# Patient Record
Sex: FEMALE | Race: OTHER | NOT HISPANIC OR LATINO | URBAN - METROPOLITAN AREA
[De-identification: names, ages, dates, MRNs, and addresses within clinical notes are randomized per-mention and may not be internally consistent; named-entity substitution may affect disease eponyms.]

---

## 2017-06-07 ENCOUNTER — OUTPATIENT (OUTPATIENT)
Dept: OUTPATIENT SERVICES | Facility: HOSPITAL | Age: 42
LOS: 1 days | Discharge: HOME | End: 2017-06-07

## 2017-06-28 DIAGNOSIS — N92.6 IRREGULAR MENSTRUATION, UNSPECIFIED: ICD-10-CM

## 2017-06-28 DIAGNOSIS — Z01.419 ENCOUNTER FOR GYNECOLOGICAL EXAMINATION (GENERAL) (ROUTINE) WITHOUT ABNORMAL FINDINGS: ICD-10-CM

## 2017-06-28 DIAGNOSIS — Z11.3 ENCOUNTER FOR SCREENING FOR INFECTIONS WITH A PREDOMINANTLY SEXUAL MODE OF TRANSMISSION: ICD-10-CM

## 2018-02-02 ENCOUNTER — OUTPATIENT (OUTPATIENT)
Dept: OUTPATIENT SERVICES | Facility: HOSPITAL | Age: 43
LOS: 1 days | Discharge: HOME | End: 2018-02-02

## 2018-02-02 DIAGNOSIS — Z00.00 ENCOUNTER FOR GENERAL ADULT MEDICAL EXAMINATION WITHOUT ABNORMAL FINDINGS: ICD-10-CM

## 2018-08-06 ENCOUNTER — TRANSCRIPTION ENCOUNTER (OUTPATIENT)
Age: 43
End: 2018-08-06

## 2020-04-26 ENCOUNTER — MESSAGE (OUTPATIENT)
Age: 45
End: 2020-04-26

## 2020-05-07 ENCOUNTER — APPOINTMENT (OUTPATIENT)
Dept: DISASTER EMERGENCY | Facility: HOSPITAL | Age: 45
End: 2020-05-07

## 2020-05-08 LAB
SARS-COV-2 IGG SERPL IA-ACNC: <0.1 INDEX
SARS-COV-2 IGG SERPL QL IA: NEGATIVE

## 2020-08-17 ENCOUNTER — TRANSCRIPTION ENCOUNTER (OUTPATIENT)
Age: 45
End: 2020-08-17

## 2021-04-28 ENCOUNTER — TRANSCRIPTION ENCOUNTER (OUTPATIENT)
Age: 46
End: 2021-04-28

## 2021-06-01 ENCOUNTER — TRANSCRIPTION ENCOUNTER (OUTPATIENT)
Age: 46
End: 2021-06-01

## 2022-06-20 ENCOUNTER — NON-APPOINTMENT (OUTPATIENT)
Age: 47
End: 2022-06-20

## 2023-11-07 ENCOUNTER — NON-APPOINTMENT (OUTPATIENT)
Age: 48
End: 2023-11-07

## 2023-11-24 ENCOUNTER — NON-APPOINTMENT (OUTPATIENT)
Age: 48
End: 2023-11-24

## 2024-03-23 ENCOUNTER — APPOINTMENT (OUTPATIENT)
Dept: ORTHOPEDIC SURGERY | Facility: CLINIC | Age: 49
End: 2024-03-23
Payer: OTHER MISCELLANEOUS

## 2024-03-23 ENCOUNTER — NON-APPOINTMENT (OUTPATIENT)
Age: 49
End: 2024-03-23

## 2024-03-23 VITALS — HEIGHT: 69 IN | WEIGHT: 125 LBS | BODY MASS INDEX: 18.51 KG/M2

## 2024-03-23 PROBLEM — Z00.00 ENCOUNTER FOR PREVENTIVE HEALTH EXAMINATION: Status: ACTIVE | Noted: 2024-03-23

## 2024-03-23 PROCEDURE — 99204 OFFICE O/P NEW MOD 45 MIN: CPT | Mod: ACP

## 2024-03-23 PROCEDURE — 72040 X-RAY EXAM NECK SPINE 2-3 VW: CPT

## 2024-03-23 PROCEDURE — 73080 X-RAY EXAM OF ELBOW: CPT | Mod: LT

## 2024-03-23 PROCEDURE — 73030 X-RAY EXAM OF SHOULDER: CPT | Mod: LT

## 2024-03-23 NOTE — IMAGING
[de-identified] : Physical examination left shoulder: No swelling, ecchymosis, erythema appreciated.  Skin is intact.  No midline neck or paraspinal tenderness.  Mild tenderness of the anterior glenohumeral joint and lateral deltoid.  Good range of motion upon flexion and abduction of the left shoulder without any limitations.  4-5 strength.  Negative Muro.  Negative Maury's.  Negative drop arm testing.  Sensorimotor intact distally.  Neurovascularly intact.  Physical examination of the left elbow: Mild swelling appreciated over the distal humerus and elbow.  Full range of motion upon full flexion extension however experiences pain upon palpation along the lateral aspect/lateral epicondyle of the left elbow.  Good strength throughout.   No instability appreciated.  Sensorimotor intact distally.  Neuro vas intact.  X-rays of the cervical spine, left shoulder, and left elbow taken in the office today:  No acute fractures, subluxations, or dislocations.

## 2024-03-23 NOTE — HISTORY OF PRESENT ILLNESS
[de-identified] : 48-year-old female here for evaluation status post a Worker's Compensation left arm injury.  Patient reports a heavy object fell and landed directly on her distal humerus/left elbow.  She reports bruising and pain in this area since the time of injury.  It happened a few days ago.  She is also reporting some numbness and tingling into he left hand and wrist.

## 2024-03-23 NOTE — DISCUSSION/SUMMARY
[de-identified] : The patient has a questionable acute closed nondisplaced fracture of the lateral condyle of the left elbow as evidenced on x-ray.  She is also having bruising and point tenderness in this area.  For these reasons, I will send the patient for left elbow MRI to further evaluate and further assess the internal derangement of a questionable acute closed nondisplaced fracture of the left elbow.  Prescription for MRI was placed.  Meloxicam sent to patient pharmacy to be taken as needed for pain.  Confirmed no contraindication to NSAIDs.  Encourage gentle range of motion activity modification until repeat evaluation.  She remain 100% temporary totally disabled from work at this time.  All questions and concerns addressed to patient's satisfaction. Patient expresses full understanding of treatment plan. I will have her follow-up with me in a couple of weeks for MRI results x-rays from the \[facility]\"" and for repeat evaluation and treatment.

## 2024-03-28 RX ORDER — TIZANIDINE 4 MG/1
4 TABLET ORAL
Qty: 7 | Refills: 0 | Status: ACTIVE | COMMUNITY
Start: 2024-03-28 | End: 1900-01-01

## 2024-04-02 ENCOUNTER — APPOINTMENT (OUTPATIENT)
Dept: ORTHOPEDIC SURGERY | Facility: CLINIC | Age: 49
End: 2024-04-02
Payer: OTHER MISCELLANEOUS

## 2024-04-02 DIAGNOSIS — M54.2 CERVICALGIA: ICD-10-CM

## 2024-04-02 PROCEDURE — 99204 OFFICE O/P NEW MOD 45 MIN: CPT

## 2024-04-09 ENCOUNTER — APPOINTMENT (OUTPATIENT)
Dept: ORTHOPEDIC SURGERY | Facility: CLINIC | Age: 49
End: 2024-04-09

## 2024-04-11 ENCOUNTER — NON-APPOINTMENT (OUTPATIENT)
Age: 49
End: 2024-04-11

## 2024-04-12 ENCOUNTER — APPOINTMENT (OUTPATIENT)
Dept: ORTHOPEDIC SURGERY | Facility: CLINIC | Age: 49
End: 2024-04-12

## 2024-04-18 ENCOUNTER — APPOINTMENT (OUTPATIENT)
Dept: ORTHOPEDIC SURGERY | Facility: CLINIC | Age: 49
End: 2024-04-18

## 2024-04-23 ENCOUNTER — RX RENEWAL (OUTPATIENT)
Age: 49
End: 2024-04-23

## 2024-05-13 NOTE — ADDENDUM
[FreeTextEntry1] : Patient continues to have neck pain radiating down her left arm despite physical therapy I am ordering MRI of the cervical spine.

## 2024-05-13 NOTE — DATA REVIEWED
[FreeTextEntry1] : I reviewed the patient's cervical spine x-ray from her prior visit she has C5-6 disc base degeneration with an anterior osteophyte calcification of the disc.

## 2024-05-13 NOTE — IMAGING
[de-identified] : TTP midline cervical spine and paraspinal musculature   Strength                                                                     Deltoid   Right: 5/5; Left: 5/5                      Biceps   Right: 5/5; Left: 5/5                   Triceps        Right: 5/5; Left: 5/5                                 Wrist Extensors     Right: 5/5; Left: 5/5 Finger Flexors     Right: 5/5; Left: 5/5 IO    Right: 5/5; Left: 5/5  Sensation C5   Right: 2/2; Left: 2/2 C6   Right: 2/2; Left: 2/2 C7   Right: 2/2; Left: 2/2 C8   Right: 2/2; Left: 2/2 T1   Right: 2/2; Left: 2/2  Reflexes Biceps   Right: 2+; Left 2+ Triceps   Right: 2+; Left 2+ Mcbride's  Right: Negative; L: Negative

## 2024-05-13 NOTE — HISTORY OF PRESENT ILLNESS
[de-identified] : 48-year-old female presents to me with neck pain.  This is after work-related accident on 3/20/2024.  She was seen here last week.  She was given a prescription for anti-inflammatories and x-ray of her cervical spine was taken.  Loss of bladder bowel.  Neck pain radiates down her left arm with a burning and numbness tingling into the hand.  Nothing on the right arm no balance issues.  No loss of bladder or bowel.

## 2024-05-13 NOTE — DISCUSSION/SUMMARY
[de-identified] : 48-year-old female with almost 2 weeks of cervical radiculopathy after work-related accident 3/20/2024.  I recommended physical therapy and continued anti-inflammatories she was prescribed.  Follow-up with me as needed.

## 2024-05-21 ENCOUNTER — RX RENEWAL (OUTPATIENT)
Age: 49
End: 2024-05-21

## 2024-05-21 RX ORDER — MELOXICAM 15 MG/1
15 TABLET ORAL
Qty: 30 | Refills: 0 | Status: ACTIVE | COMMUNITY
Start: 2024-03-23 | End: 1900-01-01

## 2024-06-05 ENCOUNTER — APPOINTMENT (OUTPATIENT)
Dept: NEUROLOGY | Facility: CLINIC | Age: 49
End: 2024-06-05
Payer: OTHER MISCELLANEOUS

## 2024-06-05 VITALS
HEART RATE: 70 BPM | BODY MASS INDEX: 19.26 KG/M2 | DIASTOLIC BLOOD PRESSURE: 75 MMHG | HEIGHT: 69 IN | WEIGHT: 130 LBS | SYSTOLIC BLOOD PRESSURE: 135 MMHG

## 2024-06-05 DIAGNOSIS — S59.902A UNSPECIFIED INJURY OF LEFT ELBOW, INITIAL ENCOUNTER: ICD-10-CM

## 2024-06-05 DIAGNOSIS — S49.92XA UNSPECIFIED INJURY OF LEFT SHOULDER AND UPPER ARM, INITIAL ENCOUNTER: ICD-10-CM

## 2024-06-05 PROCEDURE — G2211 COMPLEX E/M VISIT ADD ON: CPT | Mod: NC,1L

## 2024-06-05 PROCEDURE — 99244 OFF/OP CNSLTJ NEW/EST MOD 40: CPT

## 2024-06-05 RX ORDER — GABAPENTIN 300 MG/1
300 CAPSULE ORAL 3 TIMES DAILY
Qty: 90 | Refills: 3 | Status: ACTIVE | COMMUNITY
Start: 2024-06-05 | End: 1900-01-01

## 2024-06-05 NOTE — PHYSICAL EXAM
[Person] : oriented to person [Place] : oriented to place [Time] : oriented to time [Concentration Intact] : normal concentrating ability [Visual Intact] : visual attention was ~T not ~L decreased [Naming Objects] : no difficulty naming common objects [Repeating Phrases] : no difficulty repeating a phrase [Writing A Sentence] : no difficulty writing a sentence [Fluency] : fluency intact [Comprehension] : comprehension intact [Reading] : reading intact [Past History] : adequate knowledge of personal past history [Cranial Nerves Optic (II)] : visual acuity intact bilaterally,  visual fields full to confrontation, pupils equal round and reactive to light [Cranial Nerves Oculomotor (III)] : extraocular motion intact [Cranial Nerves Trigeminal (V)] : facial sensation intact symmetrically [Cranial Nerves Facial (VII)] : face symmetrical [Cranial Nerves Vestibulocochlear (VIII)] : hearing was intact bilaterally [Cranial Nerves Glossopharyngeal (IX)] : tongue and palate midline [Cranial Nerves Accessory (XI - Cranial And Spinal)] : head turning and shoulder shrug symmetric [Cranial Nerves Hypoglossal (XII)] : there was no tongue deviation with protrusion [Motor Tone] : muscle tone was normal in all four extremities [Motor Strength] : muscle strength was normal in all four extremities [No Muscle Atrophy] : normal bulk in all four extremities [Abnormal Walk] : normal gait [Balance] : balance was intact [2+] : Ankle jerk left 2+ [Past-pointing] : there was no past-pointing [Tremor] : no tremor present [Plantar Reflex Right Only] : normal on the right [Plantar Reflex Left Only] : normal on the left [FreeTextEntry6] : Limited range of motion when turning to the left, pain limited weakness in the left upper extremity.   [FreeTextEntry7] : Negative Tinel sign at wrist and elbow

## 2024-06-05 NOTE — ASSESSMENT
[FreeTextEntry1] : Cervical radiculopathy - MRI of the cervical spine reviewed and discussed with patient. - EMG/NCS of the upper extremities - Continue physical therapy, can try acupuncture - A trial of gabapentin 300 mg 3 times daily.  Patient warned of the potential side effect of medication and she reported understanding - Follow-up with pain management as scheduled  Left shoulder, elbow pain - Follow-up with orthopedics  hersymptoms are causally related to the work related accident she sustained, and she is considered to have temporary total disability and is not recommended to go back to work at this time.

## 2024-06-05 NOTE — HISTORY OF PRESENT ILLNESS
[FreeTextEntry1] : It's a pleasure to see Ms. KIP OSPINA In the office today. 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  She reports that on the day of the accident she was carrying heavy monitor which landed on her left arm which caused her to twist her neck.  Since then she has been having severe neck pain shooting down her arms as well as left shoulder elbow pain.  She has seen orthopedics and has had x-ray of the neck shoulder elbow as well as MRI of the cervical spine done.  MRI of the cervical spine showed multilevel bulging/herniated disc without any spinal cord impingement.  She has been doing physical therapy for a month and has tried multiple muscle relaxants and anti-inflammatories without much improvement.  She rates her baseline pain still at 7-8/10 even with activity.  She has appointment to see pain management in 2 days

## 2024-06-07 ENCOUNTER — APPOINTMENT (OUTPATIENT)
Dept: PAIN MANAGEMENT | Facility: CLINIC | Age: 49
End: 2024-06-07
Payer: OTHER MISCELLANEOUS

## 2024-06-07 DIAGNOSIS — M77.10 LATERAL EPICONDYLITIS, UNSPECIFIED ELBOW: ICD-10-CM

## 2024-06-07 DIAGNOSIS — M54.12 RADICULOPATHY, CERVICAL REGION: ICD-10-CM

## 2024-06-07 DIAGNOSIS — M77.12 LATERAL EPICONDYLITIS, LEFT ELBOW: ICD-10-CM

## 2024-06-07 PROCEDURE — 99204 OFFICE O/P NEW MOD 45 MIN: CPT

## 2024-06-07 NOTE — DISCUSSION/SUMMARY
[de-identified] : A discussion regarding available pain management treatment options occurred with the patient. These included interventional, rehabilitative, pharmacological, and alternative modalities. We will proceed with the following:   Interventional treatment options: 1. Patient will proceed with a left elbow injection. Risks with the procedure such as bleeding and infection was discussed in detail.   2. Patient will proceed with a LATOSHA C7-T1 MAC. Treatment options were discussed with the patient. The patient has been having persistent neck pain with radiculopathy with minimal improvement with conservative therapies. The patient was given the option to proceed with a cervical epidural steroid injection to try to get some pain relief.  If we are unable to get pain relief with this procedure, we will reassess our options before proceeding.      The risks and benefits were discussed which included bleeding, infection, nerve injury, no pain relief or worse, increased pain. All questions were answered, and the patient will schedule for the injection on the next available date.  Risk, benefits, pros and cons of procedure were explained to the patient using models and diagrams and their questions were answered.  The patient has severe anxiety of procedures that necessitates monitored anesthesia care (MAC). The procedure performed will be close to major nerves, arteries, and spinal cord and/or joint structures. Due to the proximity of these structures, we need the patient to be still during the procedure.  With the help of MAC, this will be safely achieved and decrease the risk of any complications.  Rehabilitative options: - c/w physical therapy.    Medications: - c/w gabapentin.  - c/w Meloxicam 15mg daily for 4 weeks. Discussed risks and benefits. Avoid taking for any side effects. -Patient is aware to take for 2 weeks straight than take 1 week off before repeating.  Complementary treatment options: - Patient was advised to stay away from any heavy lifting.  - Initiate physician directed activity and lifestyle modifications.  Follow up in 4-6 weeks for reassessment.  I, Cooper Coreas, attest that this documentation has been prepared under the direction and in the presence of Provider Miguel Miguel, DO The documentation recorded by the scribe, in my presence, accurately reflects the service I personally performed, and the decisions made by me with my edits as appropriate.   Best Regards, Miguel Miguel D.O.

## 2024-06-07 NOTE — HISTORY OF PRESENT ILLNESS
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 She reports that on the day of the accident she was carrying heavy monitor which fell and landed on her left arm which caused her to twist her neck. Since then, she has been having severe neck pain shooting down her arms as well as left shoulder and severe elbow pain. She has seen orthopedics and has had an x-ray of the neck, shoulder, elbow as well as MRI of the cervical spine done. MRI of the cervical spine showed multilevel bulging/herniated disc without any spinal cord impingement. She has been doing physical therapy for a month and has tried multiple muscle relaxants and anti-inflammatories without much improvement. She rates her baseline pain still at 7-8/10 even with activity. She recently consulted with Dr. Alarcon, neurology, who sent her for an EMG. Patient denies any bowel or bladder dysfunction, incontinence, or saddle anesthesia.  ACTIVITIES: Patient uses no assisted walking device at this time.  Patient has difficulty performing household chores, going to work, doing yardwork or shopping, participating in recreational activities & exercise at this time.   PRIOR PAIN TREATMENTS: No relief with traction, physical therapy, exercise, TENS, heat treatment, cold treatment, chiropractic manipulation.   Prior Pain Medications: Gabapentin, Tizanidine, Meloxicam.

## 2024-06-11 ENCOUNTER — APPOINTMENT (OUTPATIENT)
Dept: NEUROLOGY | Facility: CLINIC | Age: 49
End: 2024-06-11
Payer: OTHER MISCELLANEOUS

## 2024-06-11 PROCEDURE — 95886 MUSC TEST DONE W/N TEST COMP: CPT

## 2024-06-11 PROCEDURE — 95911 NRV CNDJ TEST 9-10 STUDIES: CPT

## 2024-07-04 ENCOUNTER — RX RENEWAL (OUTPATIENT)
Age: 49
End: 2024-07-04

## 2024-07-08 ENCOUNTER — APPOINTMENT (OUTPATIENT)
Dept: PAIN MANAGEMENT | Facility: CLINIC | Age: 49
End: 2024-07-08

## 2024-07-09 ENCOUNTER — APPOINTMENT (OUTPATIENT)
Dept: PAIN MANAGEMENT | Facility: CLINIC | Age: 49
End: 2024-07-09
Payer: OTHER MISCELLANEOUS

## 2024-07-09 DIAGNOSIS — M77.10 LATERAL EPICONDYLITIS, UNSPECIFIED ELBOW: ICD-10-CM

## 2024-07-09 DIAGNOSIS — M54.2 CERVICALGIA: ICD-10-CM

## 2024-07-09 DIAGNOSIS — S49.92XA UNSPECIFIED INJURY OF LEFT SHOULDER AND UPPER ARM, INITIAL ENCOUNTER: ICD-10-CM

## 2024-07-09 PROCEDURE — 99214 OFFICE O/P EST MOD 30 MIN: CPT | Mod: 25

## 2024-07-09 PROCEDURE — 20550 NJX 1 TENDON SHEATH/LIGAMENT: CPT | Mod: LT

## 2024-07-09 RX ORDER — METHOCARBAMOL 750 MG/1
750 TABLET, FILM COATED ORAL
Qty: 90 | Refills: 0 | Status: ACTIVE | COMMUNITY
Start: 2024-07-09 | End: 1900-01-01

## 2024-07-09 RX ORDER — LIDOCAINE 5% 700 MG/1
5 PATCH TOPICAL
Qty: 60 | Refills: 0 | Status: ACTIVE | COMMUNITY
Start: 2024-07-09 | End: 1900-01-01

## 2024-07-17 ENCOUNTER — APPOINTMENT (OUTPATIENT)
Dept: NEUROLOGY | Facility: CLINIC | Age: 49
End: 2024-07-17
Payer: OTHER MISCELLANEOUS

## 2024-07-17 VITALS — HEART RATE: 64 BPM | DIASTOLIC BLOOD PRESSURE: 75 MMHG | SYSTOLIC BLOOD PRESSURE: 126 MMHG

## 2024-07-17 DIAGNOSIS — M54.12 RADICULOPATHY, CERVICAL REGION: ICD-10-CM

## 2024-07-17 PROCEDURE — 99213 OFFICE O/P EST LOW 20 MIN: CPT | Mod: ACP

## 2024-07-17 RX ORDER — METHOCARBAMOL 750 MG/1
750 TABLET, FILM COATED ORAL
Qty: 90 | Refills: 0 | Status: ACTIVE | COMMUNITY
Start: 2024-07-09

## 2024-07-24 ENCOUNTER — APPOINTMENT (OUTPATIENT)
Dept: ORTHOPEDIC SURGERY | Facility: CLINIC | Age: 49
End: 2024-07-24

## 2024-08-12 ENCOUNTER — APPOINTMENT (OUTPATIENT)
Dept: PAIN MANAGEMENT | Facility: CLINIC | Age: 49
End: 2024-08-12
Payer: OTHER MISCELLANEOUS

## 2024-08-12 DIAGNOSIS — M77.10 LATERAL EPICONDYLITIS, UNSPECIFIED ELBOW: ICD-10-CM

## 2024-08-12 PROCEDURE — 99214 OFFICE O/P EST MOD 30 MIN: CPT

## 2024-08-12 RX ORDER — METHYLPREDNISOLONE 4 MG/1
4 TABLET ORAL
Qty: 1 | Refills: 0 | Status: ACTIVE | COMMUNITY
Start: 2024-08-12 | End: 1900-01-01

## 2024-08-12 NOTE — PHYSICAL EXAM
[de-identified] : pain with cervical flexion, extension spurlings  + left side ttp to left ecrb

## 2024-08-12 NOTE — DISCUSSION/SUMMARY
[de-identified] : A discussion regarding available pain management treatment options occurred with the patient. These included interventional, rehabilitative, pharmacological, and alternative modalities. We will proceed with the following:   *I believe the neck pain is related to this injury.   Interventional treatment options: 1. Referred pt to Dr. Mcclain for possible left elbow PRP.   2. Patient has documented myofascial spasms and trigger points in the muscle. Patient has trialed rehab (Home exercise, physical therapy or chiropractic care) and medications. We will schedule a series of 1-3 cervical trapezius trigger points over 3-6 weeks. If 50% or greater relief for 6-8 weeks another can be repeated vs Botox.  3. Patient will proceed with a LATOSHA C7-T1 MAC. Treatment options were discussed with the patient. The patient has been having persistent neck pain with radiculopathy with minimal improvement with conservative therapies. The patient was given the option to proceed with a cervical epidural steroid injection to try to get some pain relief.  If we are unable to get pain relief with this procedure we will reassess our options before proceeding.      The risks and benefits were discussed which included bleeding, infection, nerve injury, no pain relief or worse, increased pain. All questions were answered, and the patient will schedule for the injection on the next available date.   Risk, benefits, pros and cons of procedure were explained to the patient using models and diagrams and their questions were answered.   The patient has severe anxiety of procedures that necessitates monitored anesthesia care (MAC). The procedure performed will be close to major nerves, arteries, and spinal cord and/or joint structures. Due to the proximity of these structures, we need the patient to be still during the procedure.  With the help of MAC, this will be safely achieved and decrease the risk of any complications.  Rehabilitative options: - c/w physical therapy.  - c/w participation in active HEP as discussed and printed. I gave the patient a list of home exercises to do for pain reduction and overall improvement in functional status including but not limited to active neck rotation, active neck side bend, neck flexion, neck extension, chin tuck, scalene stretch, isometric neck flexion, isometric neck extension, isometric neck side bend, head lift/neck curl, head lift/neck side bend, neck extension on hands and knees, and scapula squeeze.   Medication based treatment options: - ordered a Medrol Dose Pack 4mg, use as directed for a duration of 6 days.   Complementary treatment options: - Patient was advised to stay away from any heavy lifting. If needed, she was advised to squat and not bend forward. - Physician directed activity and lifestyle modifications.  Follow up in 4-6 weeks for reassessment.  I, Cooper Coreas, attest that this documentation has been prepared under the direction and in the presence of Provider Miguel Miguel, DO The documentation recorded by the scribe, in my presence, accurately reflects the service I personally performed, and the decisions made by me with my edits as appropriate.  Best Regards, Miguel Miguel D.O.

## 2024-08-12 NOTE — HISTORY OF PRESENT ILLNESS
[FreeTextEntry1] : PRESENTING TODAY 08-: Patient presents to the office today for a follow up visit with continued neck and left elbow pain. She continues with complaint of her neck and left arm/shoulder pain. She is status post a left elbow steroid injection since the last visit which provided her with 30-50% relief for a few days. The pain has returned to baseline. She notes she has been completing a home exercise regimen focusing on pectoral stretching, scapular stabilizer strengthening and chin tuck for at least 30 mins a day 4x a week which has been providing her with minimal to no relief. She notes her neck pain radiates into her left arm and down to her elbow. She rates her pain a 7 out of 10 on the pain scale today.

## 2024-08-14 ENCOUNTER — APPOINTMENT (OUTPATIENT)
Dept: ORTHOPEDIC SURGERY | Facility: CLINIC | Age: 49
End: 2024-08-14
Payer: OTHER MISCELLANEOUS

## 2024-08-14 DIAGNOSIS — M54.12 RADICULOPATHY, CERVICAL REGION: ICD-10-CM

## 2024-08-14 PROCEDURE — 99214 OFFICE O/P EST MOD 30 MIN: CPT

## 2024-08-14 NOTE — HISTORY OF PRESENT ILLNESS
[de-identified] : 49-year-old female presents to me for follow-up.  She had a cervical MRI done she continues to have severe neck pain radiating down her left arm.  She is on modified work duty.  She is awaiting injection for cervical spine however Worker's Compensation is not approving it.  This was to be clear Worker's Compensation injury.  Her date of injury was 3/20/2024.

## 2024-08-14 NOTE — DATA REVIEWED
[FreeTextEntry1] : MRI cervical spine reviewed.  I reviewed the report which is not to the chart.  The patient did not have a CD with her.

## 2024-08-14 NOTE — DISCUSSION/SUMMARY
[de-identified] : 49-year-old female cervical radiculopathy.  This is a work accident.  Advised the patient to get a cervical epidural steroid injection advised Worker's Compensation to approve with this treatment given that the patient has persistent cervical radiculopathy with MRI imaging confirming nerve compression.  I will see the patient back she will drop off a CD for me to review.  She will be on modified work duty for now.  She is about 50% temporarily disabled and can work on modified duty.

## 2024-09-05 ENCOUNTER — APPOINTMENT (OUTPATIENT)
Dept: ORTHOPEDIC SURGERY | Facility: CLINIC | Age: 49
End: 2024-09-05
Payer: OTHER MISCELLANEOUS

## 2024-09-05 DIAGNOSIS — S59.902A UNSPECIFIED INJURY OF LEFT ELBOW, INITIAL ENCOUNTER: ICD-10-CM

## 2024-09-05 PROCEDURE — 99204 OFFICE O/P NEW MOD 45 MIN: CPT

## 2024-09-06 ENCOUNTER — APPOINTMENT (OUTPATIENT)
Dept: PAIN MANAGEMENT | Facility: CLINIC | Age: 49
End: 2024-09-06
Payer: OTHER MISCELLANEOUS

## 2024-09-06 DIAGNOSIS — M54.12 RADICULOPATHY, CERVICAL REGION: ICD-10-CM

## 2024-09-06 DIAGNOSIS — M77.10 LATERAL EPICONDYLITIS, UNSPECIFIED ELBOW: ICD-10-CM

## 2024-09-06 PROCEDURE — 99214 OFFICE O/P EST MOD 30 MIN: CPT

## 2024-09-07 NOTE — DISCUSSION/SUMMARY
[de-identified] : A discussion regarding available pain management treatment options occurred with the patient. These included interventional, rehabilitative, pharmacological, and alternative modalities. We will proceed with the following:   *I believe the neck pain is related to this injury.   Interventional treatment options: 1. Patient has documented myofascial spasms and trigger points in the muscle. Patient has trialed rehab (Home exercise, physical therapy or chiropractic care) and medications. We will schedule a series of 1-3 cervical trapezius trigger points over 3-6 weeks. If 50% or greater relief for 6-8 weeks another can be repeated vs Botox.  2. Patient will proceed with a LATOSHA C7-T1 MAC. Treatment options were discussed with the patient. The patient has been having persistent neck pain with radiculopathy with minimal improvement with conservative therapies. The patient was given the option to proceed with a cervical epidural steroid injection to try to get some pain relief.  If we are unable to get pain relief with this procedure, we will reassess our options before proceeding.      The risks and benefits were discussed which included bleeding, infection, nerve injury, no pain relief or worse, increased pain. All questions were answered, and the patient will schedule for the injection on the next available date.   Risk, benefits, pros and cons of procedure were explained to the patient using models and diagrams and their questions were answered.   The patient has severe anxiety of procedures that necessitates monitored anesthesia care (MAC). The procedure performed will be close to major nerves, arteries, and spinal cord and/or joint structures. Due to the proximity of these structures, we need the patient to be still during the procedure.  With the help of MAC, this will be safely achieved and decrease the risk of any complications.  Rehabilitative options: - c/w physical therapy.  - c/w participation in active HEP as discussed and printed. I gave the patient a list of home exercises to do for pain reduction and overall improvement in functional status including but not limited to active neck rotation, active neck side bend, neck flexion, neck extension, chin tuck, scalene stretch, isometric neck flexion, isometric neck extension, isometric neck side bend, head lift/neck curl, head lift/neck side bend, neck extension on hands and knees, and scapula squeeze.      Complementary treatment options: - Patient was advised to stay away from any heavy lifting. If needed, she was advised to squat and not bend forward. - Physician directed activity and lifestyle modifications.  Follow up in 4-6 weeks for reassessment.  I, Cooper Coreas, attest that this documentation has been prepared under the direction and in the presence of Provider Miguel Miguel, DO The documentation recorded by the scribe, in my presence, accurately reflects the service I personally performed, and the decisions made by me with my edits as appropriate.  Best Regards, Miguel Miguel D.O.

## 2024-09-07 NOTE — PHYSICAL EXAM
[de-identified] : pain with cervical flexion, extension spurlings  + left side ttp to left ecrb

## 2024-09-07 NOTE — HISTORY OF PRESENT ILLNESS
[FreeTextEntry1] : PRESENTING TODAY 09-: Patient presents to the office today for a follow up visit with continued neck and left elbow pain. She continues with complaints of neck and left arm/shoulder pain. She is most recently status post a left elbow steroid injection since the last visit which provided her with 30-50% relief for a few days. The pain has returned to baseline. She notes she has been completing a home exercise regimen focusing on pectoral stretching, scapular stabilizer strengthening and chin tuck for at least 30 mins a day 4x a week which has been providing her with minimal to no relief. She notes her neck pain radiates into her left arm and down to her elbow. She rates her pain a 7 out of 10 on the pain scale today. Patient denies any bowel or bladder dysfunction, incontinence, or saddle anesthesia.

## 2024-09-07 NOTE — PHYSICAL EXAM
[de-identified] : pain with cervical flexion, extension spurlings  + left side ttp to left ecrb

## 2024-09-09 NOTE — HISTORY OF PRESENT ILLNESS
[de-identified] : 49-year-old patient here for evaluation of left elbow pain.  She has a history of left elbow lateral epicondylitis.  It is affecting her quality of life.  She has undergone conservative management including physical therapy medications and injections with cortisone.  Not have helped.  She is here to discuss further treatment options  Left elbow: Tender palpation over the lateral epicondyle, pain with resisted wrist extension over the lateral epicondyle, full range of motion, neurovascular intact, no skin breakdown  MRI of the left elbow significant for partial tear of the common extensor tendon  Plan I went over findings with the patient.  I reviewed lateral epicondylitis and treatment options.  Spoke to her about operative is nonoperative options including risk benefits and alternatives.  I spoke to her about platelet rich plasma which she is very interested in.  I spoke to her about how these effects may not be long-lasting.  She understands.  Authorization will be put in for left elbow platelet rich plasma.  All questions were answered.  She understands that she would have to refrain from anti-inflammatory medications for 3 weeks.

## 2024-09-17 ENCOUNTER — RX RENEWAL (OUTPATIENT)
Age: 49
End: 2024-09-17

## 2024-10-04 ENCOUNTER — APPOINTMENT (OUTPATIENT)
Dept: PAIN MANAGEMENT | Facility: CLINIC | Age: 49
End: 2024-10-04
Payer: OTHER MISCELLANEOUS

## 2024-10-04 DIAGNOSIS — M77.10 LATERAL EPICONDYLITIS, UNSPECIFIED ELBOW: ICD-10-CM

## 2024-10-04 DIAGNOSIS — M54.12 RADICULOPATHY, CERVICAL REGION: ICD-10-CM

## 2024-10-04 PROCEDURE — 99214 OFFICE O/P EST MOD 30 MIN: CPT

## 2024-10-05 NOTE — DISCUSSION/SUMMARY
[de-identified] : A discussion regarding available pain management treatment options occurred with the patient. These included interventional, rehabilitative, pharmacological, and alternative modalities. We will proceed with the following:   *I believe the neck pain is related to this injury.   Interventional treatment options:  1. Patient will proceed with a LATOSHA C7-T1 MAC. Treatment options were discussed with the patient. The patient has been having persistent neck pain with radiculopathy with minimal improvement with conservative therapies. The patient was given the option to proceed with a cervical epidural steroid injection to try to get some pain relief.  If we are unable to get pain relief with this procedure, we will reassess our options before proceeding.      The risks and benefits were discussed which included bleeding, infection, nerve injury, no pain relief or worse, increased pain. All questions were answered, and the patient will schedule for the injection on the next available date.   Risk, benefits, pros and cons of procedure were explained to the patient using models and diagrams and their questions were answered.   The patient has severe anxiety of procedures that necessitates monitored anesthesia care (MAC). The procedure performed will be close to major nerves, arteries, and spinal cord and/or joint structures. Due to the proximity of these structures, we need the patient to be still during the procedure.  With the help of MAC, this will be safely achieved and decrease the risk of any complications.  Rehabilitative options: - c/w physical therapy.  - c/w participation in active HEP as discussed and printed. I gave the patient a list of home exercises to do for pain reduction and overall improvement in functional status including but not limited to active neck rotation, active neck side bend, neck flexion, neck extension, chin tuck, scalene stretch, isometric neck flexion, isometric neck extension, isometric neck side bend, head lift/neck curl, head lift/neck side bend, neck extension on hands and knees, and scapula squeeze.   Medication:  -Ordered lidocaine 5% external patch, apply to affected area as directed.  Complementary treatment options: - Patient was advised to stay away from any heavy lifting. If needed, she was advised to squat and not bend forward. - Physician directed activity and lifestyle modifications.  Follow up 1-2 weeks post injection for assessment of efficacy and further recommendations.  I, Cooper Coreas, attest that this documentation has been prepared under the direction and in the presence of Provider Miguel Miguel, DO The documentation recorded by the scribe, in my presence, accurately reflects the service I personally performed, and the decisions made by me with my edits as appropriate.  Best Regards, Miguel Miguel D.O.

## 2024-10-05 NOTE — HISTORY OF PRESENT ILLNESS
[FreeTextEntry1] : PRESENTING TODAY 10-: Patient presents to the office today for a follow up visit with continued neck and left elbow pain. She continues with complaints of neck and left arm/shoulder pain. The pain is described as sharp, burning, tingling that radiates down the left arm and is 9/10 pain intensity.  NSAIDs, PT has been tried without relief.  She notes she has been completing a home exercise regimen focusing on pectoral stretching, scapular stabilizer strengthening and chin tuck for at least 30 mins a day 4x a week which has been providing her with minimal to no relief. She notes her neck pain radiates into her left arm and down to her elbow.  Her [facility] doctor agrees that her neck pain is related to this injury.  She has returned to work however is in immense severe pain and leaves early secondary to the pain.  The patient's functional status and quality of life has been significantly impacted negatively from the pain and has not responded to conservative measures such as rest, heat, OTC pain medications, and activity modifications. She rates her pain an 8 out of 10 on the pain scale today. Patient denies any bowel or bladder dysfunction, incontinence, or saddle anesthesia.  She presents with tennis elbow; I will be referring her back to Dr. Mcclain for PRP of the left lateral epicondylitis.

## 2024-10-05 NOTE — DISCUSSION/SUMMARY
[de-identified] : A discussion regarding available pain management treatment options occurred with the patient. These included interventional, rehabilitative, pharmacological, and alternative modalities. We will proceed with the following:   *I believe the neck pain is related to this injury.   Interventional treatment options:  1. Patient will proceed with a LATOSHA C7-T1 MAC. Treatment options were discussed with the patient. The patient has been having persistent neck pain with radiculopathy with minimal improvement with conservative therapies. The patient was given the option to proceed with a cervical epidural steroid injection to try to get some pain relief.  If we are unable to get pain relief with this procedure, we will reassess our options before proceeding.      The risks and benefits were discussed which included bleeding, infection, nerve injury, no pain relief or worse, increased pain. All questions were answered, and the patient will schedule for the injection on the next available date.   Risk, benefits, pros and cons of procedure were explained to the patient using models and diagrams and their questions were answered.   The patient has severe anxiety of procedures that necessitates monitored anesthesia care (MAC). The procedure performed will be close to major nerves, arteries, and spinal cord and/or joint structures. Due to the proximity of these structures, we need the patient to be still during the procedure.  With the help of MAC, this will be safely achieved and decrease the risk of any complications.  Rehabilitative options: - c/w physical therapy.  - c/w participation in active HEP as discussed and printed. I gave the patient a list of home exercises to do for pain reduction and overall improvement in functional status including but not limited to active neck rotation, active neck side bend, neck flexion, neck extension, chin tuck, scalene stretch, isometric neck flexion, isometric neck extension, isometric neck side bend, head lift/neck curl, head lift/neck side bend, neck extension on hands and knees, and scapula squeeze.   Medication:  -Ordered lidocaine 5% external patch, apply to affected area as directed.  Complementary treatment options: - Patient was advised to stay away from any heavy lifting. If needed, she was advised to squat and not bend forward. - Physician directed activity and lifestyle modifications.  Follow up 1-2 weeks post injection for assessment of efficacy and further recommendations.  I, Cooper Coreas, attest that this documentation has been prepared under the direction and in the presence of Provider Miguel Miguel, DO The documentation recorded by the scribe, in my presence, accurately reflects the service I personally performed, and the decisions made by me with my edits as appropriate.  Best Regards, Miguel Miguel D.O.

## 2024-10-05 NOTE — HISTORY OF PRESENT ILLNESS
[FreeTextEntry1] : PRESENTING TODAY 10-: Patient presents to the office today for a follow up visit with continued neck and left elbow pain. She continues with complaints of neck and left arm/shoulder pain. The pain is described as sharp, burning, tingling that radiates down the left arm and is 9/10 pain intensity.  NSAIDs, PT has been tried without relief.  She notes she has been completing a home exercise regimen focusing on pectoral stretching, scapular stabilizer strengthening and chin tuck for at least 30 mins a day 4x a week which has been providing her with minimal to no relief. She notes her neck pain radiates into her left arm and down to her elbow.  Her [facility] doctor agrees that her neck pain is related to this injury.  She has returned to work however is in immense severe pain and leaves early secondary to the pain.  The patient's functional status and quality of life has been significantly impacted negatively from the pain and has not responded to conservative measures such as rest, heat, OTC pain medications, and activity modifications. She rates her pain an 8 out of 10 on the pain scale today. Patient denies any bowel or bladder dysfunction, incontinence, or saddle anesthesia.  She presents with tennis elbow; I will be referring her back to Dr. Mcclain for PRP of the left lateral epicondylitis. PERRL/EOMI/conjunctiva clear

## 2024-10-05 NOTE — PHYSICAL EXAM
[de-identified] : pain with cervical flexion, extension spurlings  + left side ttp to left ecrb

## 2024-10-05 NOTE — PHYSICAL EXAM
[de-identified] : pain with cervical flexion, extension spurlings  + left side ttp to left ecrb

## 2024-10-17 ENCOUNTER — APPOINTMENT (OUTPATIENT)
Dept: PAIN MANAGEMENT | Facility: CLINIC | Age: 49
End: 2024-10-17
Payer: OTHER MISCELLANEOUS

## 2024-10-17 ENCOUNTER — APPOINTMENT (OUTPATIENT)
Facility: CLINIC | Age: 49
End: 2024-10-17

## 2024-10-17 DIAGNOSIS — M54.12 RADICULOPATHY, CERVICAL REGION: ICD-10-CM

## 2024-10-17 PROCEDURE — 00600 ANES PX CRV SPINE&CORD NOS: CPT | Mod: QZ,P2

## 2024-10-17 PROCEDURE — 62321 NJX INTERLAMINAR CRV/THRC: CPT

## 2024-10-22 ENCOUNTER — APPOINTMENT (OUTPATIENT)
Dept: ORTHOPEDIC SURGERY | Facility: CLINIC | Age: 49
End: 2024-10-22

## 2024-10-22 DIAGNOSIS — S59.902A UNSPECIFIED INJURY OF LEFT ELBOW, INITIAL ENCOUNTER: ICD-10-CM

## 2024-10-22 PROCEDURE — 99214 OFFICE O/P EST MOD 30 MIN: CPT | Mod: 25

## 2024-10-22 PROCEDURE — 20610 DRAIN/INJ JOINT/BURSA W/O US: CPT | Mod: LT

## 2024-10-29 ENCOUNTER — APPOINTMENT (OUTPATIENT)
Dept: PAIN MANAGEMENT | Facility: CLINIC | Age: 49
End: 2024-10-29

## 2024-11-01 ENCOUNTER — APPOINTMENT (OUTPATIENT)
Dept: PAIN MANAGEMENT | Facility: CLINIC | Age: 49
End: 2024-11-01
Payer: OTHER MISCELLANEOUS

## 2024-11-01 ENCOUNTER — APPOINTMENT (OUTPATIENT)
Dept: PAIN MANAGEMENT | Facility: CLINIC | Age: 49
End: 2024-11-01

## 2024-11-01 DIAGNOSIS — M54.12 RADICULOPATHY, CERVICAL REGION: ICD-10-CM

## 2024-11-01 DIAGNOSIS — M47.812 SPONDYLOSIS W/OUT MYELOPATHY OR RADICULOPATHY, CERVICAL REGION: ICD-10-CM

## 2024-11-01 PROCEDURE — 99214 OFFICE O/P EST MOD 30 MIN: CPT

## 2024-11-01 PROCEDURE — 72050 X-RAY EXAM NECK SPINE 4/5VWS: CPT

## 2024-11-02 PROBLEM — M47.812 ARTHROPATHY OF CERVICAL FACET JOINT: Status: ACTIVE | Noted: 2024-11-02

## 2024-11-04 ENCOUNTER — APPOINTMENT (OUTPATIENT)
Dept: NEUROLOGY | Facility: CLINIC | Age: 49
End: 2024-11-04

## 2024-11-18 ENCOUNTER — APPOINTMENT (OUTPATIENT)
Dept: NEUROSURGERY | Facility: CLINIC | Age: 49
End: 2024-11-18

## 2024-12-03 ENCOUNTER — APPOINTMENT (OUTPATIENT)
Dept: PAIN MANAGEMENT | Facility: CLINIC | Age: 49
End: 2024-12-03
Payer: OTHER MISCELLANEOUS

## 2024-12-03 DIAGNOSIS — M47.812 SPONDYLOSIS W/OUT MYELOPATHY OR RADICULOPATHY, CERVICAL REGION: ICD-10-CM

## 2024-12-03 DIAGNOSIS — M54.12 RADICULOPATHY, CERVICAL REGION: ICD-10-CM

## 2024-12-03 PROCEDURE — 99214 OFFICE O/P EST MOD 30 MIN: CPT

## 2024-12-03 RX ORDER — IBUPROFEN 800 MG/1
800 TABLET, FILM COATED ORAL 3 TIMES DAILY
Qty: 90 | Refills: 1 | Status: ACTIVE | COMMUNITY
Start: 2024-12-03 | End: 1900-01-01

## 2024-12-06 ENCOUNTER — APPOINTMENT (OUTPATIENT)
Facility: CLINIC | Age: 49
End: 2024-12-06

## 2024-12-09 ENCOUNTER — APPOINTMENT (OUTPATIENT)
Dept: NEUROSURGERY | Facility: CLINIC | Age: 49
End: 2024-12-09

## 2024-12-27 ENCOUNTER — APPOINTMENT (OUTPATIENT)
Facility: CLINIC | Age: 49
End: 2024-12-27

## 2024-12-27 ENCOUNTER — APPOINTMENT (OUTPATIENT)
Dept: PAIN MANAGEMENT | Facility: CLINIC | Age: 49
End: 2024-12-27

## 2025-01-08 ENCOUNTER — APPOINTMENT (OUTPATIENT)
Dept: PAIN MANAGEMENT | Facility: CLINIC | Age: 50
End: 2025-01-08
Payer: OTHER MISCELLANEOUS

## 2025-01-08 ENCOUNTER — APPOINTMENT (OUTPATIENT)
Facility: CLINIC | Age: 50
End: 2025-01-08
Payer: OTHER MISCELLANEOUS

## 2025-01-08 DIAGNOSIS — M54.12 RADICULOPATHY, CERVICAL REGION: ICD-10-CM

## 2025-01-08 PROCEDURE — 00600 ANES PX CRV SPINE&CORD NOS: CPT | Mod: QZ,P2

## 2025-01-08 PROCEDURE — 62321 NJX INTERLAMINAR CRV/THRC: CPT

## 2025-01-28 ENCOUNTER — APPOINTMENT (OUTPATIENT)
Dept: PAIN MANAGEMENT | Facility: CLINIC | Age: 50
End: 2025-01-28

## 2025-01-30 ENCOUNTER — APPOINTMENT (OUTPATIENT)
Dept: NEUROSURGERY | Facility: CLINIC | Age: 50
End: 2025-01-30
Payer: OTHER MISCELLANEOUS

## 2025-01-30 ENCOUNTER — NON-APPOINTMENT (OUTPATIENT)
Age: 50
End: 2025-01-30

## 2025-01-30 VITALS — WEIGHT: 130 LBS | HEIGHT: 69 IN | BODY MASS INDEX: 19.26 KG/M2

## 2025-01-30 DIAGNOSIS — M54.12 RADICULOPATHY, CERVICAL REGION: ICD-10-CM

## 2025-01-30 DIAGNOSIS — M50.20 OTHER CERVICAL DISC DISPLACEMENT, UNSPECIFIED CERVICAL REGION: ICD-10-CM

## 2025-01-30 DIAGNOSIS — M54.2 CERVICALGIA: ICD-10-CM

## 2025-01-30 PROCEDURE — 99203 OFFICE O/P NEW LOW 30 MIN: CPT

## 2025-02-04 ENCOUNTER — NON-APPOINTMENT (OUTPATIENT)
Age: 50
End: 2025-02-04

## 2025-02-13 ENCOUNTER — EMERGENCY (EMERGENCY)
Facility: HOSPITAL | Age: 50
LOS: 0 days | Discharge: HOME | End: 2025-02-13
Attending: STUDENT IN AN ORGANIZED HEALTH CARE EDUCATION/TRAINING PROGRAM
Payer: COMMERCIAL

## 2025-02-13 VITALS
SYSTOLIC BLOOD PRESSURE: 124 MMHG | HEART RATE: 68 BPM | OXYGEN SATURATION: 100 % | TEMPERATURE: 99 F | RESPIRATION RATE: 16 BRPM | DIASTOLIC BLOOD PRESSURE: 76 MMHG | WEIGHT: 128.09 LBS | HEIGHT: 69 IN

## 2025-02-13 VITALS
SYSTOLIC BLOOD PRESSURE: 104 MMHG | RESPIRATION RATE: 16 BRPM | TEMPERATURE: 98 F | HEART RATE: 61 BPM | DIASTOLIC BLOOD PRESSURE: 66 MMHG | OXYGEN SATURATION: 100 %

## 2025-02-13 DIAGNOSIS — Z86.19 PERSONAL HISTORY OF OTHER INFECTIOUS AND PARASITIC DISEASES: ICD-10-CM

## 2025-02-13 DIAGNOSIS — R07.89 OTHER CHEST PAIN: ICD-10-CM

## 2025-02-13 DIAGNOSIS — R55 SYNCOPE AND COLLAPSE: ICD-10-CM

## 2025-02-13 DIAGNOSIS — R05.1 ACUTE COUGH: ICD-10-CM

## 2025-02-13 LAB
ALBUMIN SERPL ELPH-MCNC: 4.3 G/DL — SIGNIFICANT CHANGE UP (ref 3.5–5.2)
ALP SERPL-CCNC: 45 U/L — SIGNIFICANT CHANGE UP (ref 30–115)
ALT FLD-CCNC: 16 U/L — SIGNIFICANT CHANGE UP (ref 0–41)
ANION GAP SERPL CALC-SCNC: 9 MMOL/L — SIGNIFICANT CHANGE UP (ref 7–14)
AST SERPL-CCNC: 18 U/L — SIGNIFICANT CHANGE UP (ref 0–41)
BASOPHILS # BLD AUTO: 0.03 K/UL — SIGNIFICANT CHANGE UP (ref 0–0.2)
BASOPHILS NFR BLD AUTO: 0.5 % — SIGNIFICANT CHANGE UP (ref 0–1)
BILIRUB SERPL-MCNC: 0.6 MG/DL — SIGNIFICANT CHANGE UP (ref 0.2–1.2)
BUN SERPL-MCNC: 15 MG/DL — SIGNIFICANT CHANGE UP (ref 10–20)
CALCIUM SERPL-MCNC: 9.4 MG/DL — SIGNIFICANT CHANGE UP (ref 8.4–10.5)
CHLORIDE SERPL-SCNC: 106 MMOL/L — SIGNIFICANT CHANGE UP (ref 98–110)
CO2 SERPL-SCNC: 24 MMOL/L — SIGNIFICANT CHANGE UP (ref 17–32)
CREAT SERPL-MCNC: 0.8 MG/DL — SIGNIFICANT CHANGE UP (ref 0.7–1.5)
EGFR: 90 ML/MIN/1.73M2 — SIGNIFICANT CHANGE UP
EOSINOPHIL # BLD AUTO: 0.12 K/UL — SIGNIFICANT CHANGE UP (ref 0–0.7)
EOSINOPHIL NFR BLD AUTO: 2.1 % — SIGNIFICANT CHANGE UP (ref 0–8)
GLUCOSE SERPL-MCNC: 94 MG/DL — SIGNIFICANT CHANGE UP (ref 70–99)
HCT VFR BLD CALC: 34.1 % — LOW (ref 37–47)
HGB BLD-MCNC: 11.7 G/DL — LOW (ref 12–16)
IMM GRANULOCYTES NFR BLD AUTO: 0.3 % — SIGNIFICANT CHANGE UP (ref 0.1–0.3)
LYMPHOCYTES # BLD AUTO: 1.43 K/UL — SIGNIFICANT CHANGE UP (ref 1.2–3.4)
LYMPHOCYTES # BLD AUTO: 25 % — SIGNIFICANT CHANGE UP (ref 20.5–51.1)
MCHC RBC-ENTMCNC: 31.8 PG — HIGH (ref 27–31)
MCHC RBC-ENTMCNC: 34.3 G/DL — SIGNIFICANT CHANGE UP (ref 32–37)
MCV RBC AUTO: 92.7 FL — SIGNIFICANT CHANGE UP (ref 81–99)
MONOCYTES # BLD AUTO: 0.35 K/UL — SIGNIFICANT CHANGE UP (ref 0.1–0.6)
MONOCYTES NFR BLD AUTO: 6.1 % — SIGNIFICANT CHANGE UP (ref 1.7–9.3)
NEUTROPHILS # BLD AUTO: 3.77 K/UL — SIGNIFICANT CHANGE UP (ref 1.4–6.5)
NEUTROPHILS NFR BLD AUTO: 66 % — SIGNIFICANT CHANGE UP (ref 42.2–75.2)
NRBC BLD AUTO-RTO: 0 /100 WBCS — SIGNIFICANT CHANGE UP (ref 0–0)
PLATELET # BLD AUTO: 178 K/UL — SIGNIFICANT CHANGE UP (ref 130–400)
PMV BLD: 9.9 FL — SIGNIFICANT CHANGE UP (ref 7.4–10.4)
POTASSIUM SERPL-MCNC: 3.8 MMOL/L — SIGNIFICANT CHANGE UP (ref 3.5–5)
POTASSIUM SERPL-SCNC: 3.8 MMOL/L — SIGNIFICANT CHANGE UP (ref 3.5–5)
PROT SERPL-MCNC: 6.7 G/DL — SIGNIFICANT CHANGE UP (ref 6–8)
RBC # BLD: 3.68 M/UL — LOW (ref 4.2–5.4)
RBC # FLD: 11.5 % — SIGNIFICANT CHANGE UP (ref 11.5–14.5)
SODIUM SERPL-SCNC: 139 MMOL/L — SIGNIFICANT CHANGE UP (ref 135–146)
TROPONIN T, HIGH SENSITIVITY RESULT: <6 NG/L — SIGNIFICANT CHANGE UP (ref 6–13)
WBC # BLD: 5.72 K/UL — SIGNIFICANT CHANGE UP (ref 4.8–10.8)
WBC # FLD AUTO: 5.72 K/UL — SIGNIFICANT CHANGE UP (ref 4.8–10.8)

## 2025-02-13 PROCEDURE — 36415 COLL VENOUS BLD VENIPUNCTURE: CPT

## 2025-02-13 PROCEDURE — 71046 X-RAY EXAM CHEST 2 VIEWS: CPT

## 2025-02-13 PROCEDURE — 80053 COMPREHEN METABOLIC PANEL: CPT

## 2025-02-13 PROCEDURE — 99285 EMERGENCY DEPT VISIT HI MDM: CPT | Mod: 25

## 2025-02-13 PROCEDURE — 93010 ELECTROCARDIOGRAM REPORT: CPT

## 2025-02-13 PROCEDURE — 85025 COMPLETE CBC W/AUTO DIFF WBC: CPT

## 2025-02-13 PROCEDURE — 99285 EMERGENCY DEPT VISIT HI MDM: CPT

## 2025-02-13 PROCEDURE — 84484 ASSAY OF TROPONIN QUANT: CPT

## 2025-02-13 PROCEDURE — 71046 X-RAY EXAM CHEST 2 VIEWS: CPT | Mod: 26

## 2025-02-13 PROCEDURE — 96360 HYDRATION IV INFUSION INIT: CPT

## 2025-02-13 PROCEDURE — 93005 ELECTROCARDIOGRAM TRACING: CPT

## 2025-02-13 PROCEDURE — 82962 GLUCOSE BLOOD TEST: CPT

## 2025-02-13 RX ORDER — BACTERIOSTATIC SODIUM CHLORIDE 0.9 %
1000 VIAL (ML) INJECTION ONCE
Refills: 0 | Status: COMPLETED | OUTPATIENT
Start: 2025-02-13 | End: 2025-02-13

## 2025-02-13 RX ORDER — DEXAMETHASONE SODIUM PHOSPHATE 4 MG/ML
10 INJECTION, SOLUTION INTRA-ARTICULAR; INTRALESIONAL; INTRAMUSCULAR; INTRAVENOUS; SOFT TISSUE ONCE
Refills: 0 | Status: DISCONTINUED | OUTPATIENT
Start: 2025-02-13 | End: 2025-02-13

## 2025-02-13 RX ORDER — DEXAMETHASONE SODIUM PHOSPHATE 4 MG/ML
10 INJECTION, SOLUTION INTRA-ARTICULAR; INTRALESIONAL; INTRAMUSCULAR; INTRAVENOUS; SOFT TISSUE ONCE
Refills: 0 | Status: COMPLETED | OUTPATIENT
Start: 2025-02-13 | End: 2025-02-13

## 2025-02-13 RX ADMIN — DEXAMETHASONE SODIUM PHOSPHATE 10 MILLIGRAM(S): 4 INJECTION, SOLUTION INTRA-ARTICULAR; INTRALESIONAL; INTRAMUSCULAR; INTRAVENOUS; SOFT TISSUE at 12:36

## 2025-02-13 RX ADMIN — Medication 2000 MILLILITER(S): at 10:30

## 2025-02-13 RX ADMIN — Medication 1000 MILLILITER(S): at 12:00

## 2025-02-13 NOTE — ED PROVIDER NOTE - PATIENT PORTAL LINK FT
You can access the FollowMyHealth Patient Portal offered by [facility] by registering at the following website: [email or web address]. By joining [employer] [facility] [employer]’s FollowMyHealth portal, you will also be able to view your health information using other applications (apps) compatible with our system.

## 2025-02-13 NOTE — ED PROVIDER NOTE - NSFOLLOWUPINSTRUCTIONS_ED_ALL_ED_FT
Please follow up with your primary care physician and any specialist that are recommended. Return to the emergency department if your symptoms do not resolve and/or worsen. If you've been prescribed medications, please take your medications as prescribed.      Near-Syncope  Outline of the head showing blood vessels that supply the brain.  Near-syncope is when you suddenly feel like you might pass out or faint, but you do not actually lose consciousness. This may also be referred to as presyncope. During an episode of near-syncope, you may:  Feel dizzy, weak, light-headed, or like the room is spinning.  Feel nauseous.  See spots or see all white or all black in your field of vision.  Have cold, clammy skin or feel warm and sweaty.  Hear ringing in your ears (tinnitus).  This condition is caused by a sudden decrease in blood flow to the brain. This decrease can result from various causes, but most of those causes are not dangerous. However, near-syncope may be a sign of a serious medical problem, so it is important to seek medical care.    Follow these instructions at home:  Medicines    Take over-the-counter and prescription medicines only as told by your health care provider.  If you are taking blood pressure or heart medicine, get up slowly and take several minutes to sit and then stand. This can reduce dizziness and decrease the risk of near-syncope.  Lifestyle    Do not drive, use machinery, or play sports until your health care provider says it is okay.  Do not drink alcohol.  Do not use any products that contain nicotine or tobacco. These products include cigarettes, chewing tobacco, and vaping devices, such as e-cigarettes. If you need help quitting, ask your health care provider.  Avoid hot tubs and saunas.  General instructions    Pay attention to any changes in your symptoms.  Talk with your health care provider about your symptoms. You may need to have testing to understand the cause of your near-syncope.  If you start to feel like you might faint, sit or lie down right away. If sitting, put your head down between your legs. If lying down, raise (elevate) your feet above the level of your heart.  Breathe deeply and steadily. Wait until all of the symptoms have passed.  Have someone stay with you until you feel stable.  Drink enough fluid to keep your urine pale yellow.  Avoid prolonged standing. If you must stand for a long time, do movements such as:  Moving your legs.  Crossing your legs.  Flexing and stretching your leg muscles.  Squatting.  Keep all follow-up visits. This is important.  Contact a health care provider if:  You continue to have episodes of near fainting.  Get help right away if:  You faint.  You have any of these symptoms that may indicate trouble with your heart:  Fast or irregular heartbeats (palpitations).  Unusual pain in your chest, abdomen, or back.  Shortness of breath.  You have a seizure.  You have a severe headache.  You are confused.  You have vision problems.  You have severe weakness or trouble walking.  You are bleeding from your mouth or rectum, or have black or tarry stool.  These symptoms may represent a serious problem that is an emergency. Do not wait to see if your symptoms will go away. Get medical help right away. Call your local emergency services (911 in the [place] Do not drive yourself to the hospital.    Summary  Near-syncope is when you suddenly feel like you might pass out or faint, but you do not actually lose consciousness.  This condition is caused by a sudden decrease in blood flow to the brain. This decrease can result from various causes, but most of those causes are not dangerous.  Near-syncope may be a sign of a serious medical problem, so it is important to seek medical care.  If you start to feel like you might faint, sit or lie down right away. If sitting, put your head down between your legs. If lying down, raise (elevate) your feet above the level of your heart.  Talk with your health care provider about your symptoms. You may need to have testing to understand the cause of your near-syncope.  This information is not intended to replace advice given to you by your health care provider. Make sure you discuss any questions you have with your health care provider.

## 2025-02-13 NOTE — ED PROVIDER NOTE - OBJECTIVE STATEMENT
48 y/o female no significant PMHx presents for evaluation after near syncopal episode. 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 Felt like she was gonna fall so she leaned over and had to sit down before feeling improvement after. Denies preceding chest pain or shortness of breath. States that she was recently diagnosed with Influenza A on 2/4, just finished course of Tamiflu. Has been having non-productive cough with chest tightness but does not report any changes to these symptoms today. No fevers or chills. No abdominal pain. No other additional complaints or concerns.

## 2025-02-13 NOTE — ED PROVIDER NOTE - CLINICAL SUMMARY MEDICAL DECISION MAKING FREE TEXT BOX
Patient presents with episode of patient to pe in the setting of recent viral illness and feeling fatigued and still unwell.  First day back at work.  Patient did not pass out.  No chest pain shortness of breath nausea vomiting diarrhea.  No fevers.  No bleeding from anywhere.  Patient's blood work unremarkable.  EKG interpreted by me normal sinus rhythm.  I also evaluated her chest x-ray which did not show any pneumonia or other abnormalities.  Patient provided with results.  Given a dose of Decadron given ongoing cold-like symptoms and lingering cough.

## 2025-02-13 NOTE — ED PROVIDER NOTE - PHYSICAL EXAMINATION
CONSTITUTIONAL: well developed, nontoxic appearing, in no acute distress, speaking in full sentences  SKIN: warm, dry, no rash  HEENT: normocephalic, no conjunctival erythema, moist mucous membranes, patent airway  NECK: supple  CV:  regular rate  RESP: normal work of breathing, CTAB  ABD: nondistended, soft, nontender   MSK: moves all extremities, no cyanosis, no edema  NEURO: alert, oriented, grossly unremarkable  PSYCH: cooperative, appropriate

## 2025-02-18 ENCOUNTER — NON-APPOINTMENT (OUTPATIENT)
Age: 50
End: 2025-02-18

## 2025-02-18 DIAGNOSIS — Z92.89 PERSONAL HISTORY OF OTHER MEDICAL TREATMENT: ICD-10-CM

## 2025-02-18 DIAGNOSIS — Z82.49 FAMILY HISTORY OF ISCHEMIC HEART DISEASE AND OTHER DISEASES OF THE CIRCULATORY SYSTEM: ICD-10-CM

## 2025-02-18 DIAGNOSIS — N92.6 IRREGULAR MENSTRUATION, UNSPECIFIED: ICD-10-CM

## 2025-02-18 DIAGNOSIS — Z80.3 FAMILY HISTORY OF MALIGNANT NEOPLASM OF BREAST: ICD-10-CM

## 2025-03-25 ENCOUNTER — APPOINTMENT (OUTPATIENT)
Age: 50
End: 2025-03-25
Payer: COMMERCIAL

## 2025-03-25 VITALS
BODY MASS INDEX: 19.4 KG/M2 | WEIGHT: 131 LBS | HEART RATE: 71 BPM | HEIGHT: 69 IN | SYSTOLIC BLOOD PRESSURE: 132 MMHG | DIASTOLIC BLOOD PRESSURE: 77 MMHG

## 2025-03-25 DIAGNOSIS — Z01.419 ENCOUNTER FOR GYNECOLOGICAL EXAMINATION (GENERAL) (ROUTINE) W/OUT ABNORMAL FINDINGS: ICD-10-CM

## 2025-03-25 DIAGNOSIS — Z11.3 ENCOUNTER FOR SCREENING FOR INFECTIONS WITH A PREDOMINANTLY SEXUAL MODE OF TRANSMISSION: ICD-10-CM

## 2025-03-25 DIAGNOSIS — R10.2 PELVIC AND PERINEAL PAIN: ICD-10-CM

## 2025-03-25 DIAGNOSIS — N63.10 UNSPECIFIED LUMP IN THE RIGHT BREAST, UNSPECIFIED QUADRANT: ICD-10-CM

## 2025-03-25 PROCEDURE — 99396 PREV VISIT EST AGE 40-64: CPT

## 2025-03-27 LAB
C TRACH RRNA SPEC QL NAA+PROBE: NOT DETECTED
HPV HIGH+LOW RISK DNA PNL CVX: NOT DETECTED
N GONORRHOEA RRNA SPEC QL NAA+PROBE: NOT DETECTED
SOURCE TP AMPLIFICATION: NORMAL

## 2025-04-01 LAB — CYTOLOGY CVX/VAG DOC THIN PREP: NORMAL
